# Patient Record
Sex: FEMALE | Race: BLACK OR AFRICAN AMERICAN | NOT HISPANIC OR LATINO | Employment: FULL TIME | URBAN - METROPOLITAN AREA
[De-identification: names, ages, dates, MRNs, and addresses within clinical notes are randomized per-mention and may not be internally consistent; named-entity substitution may affect disease eponyms.]

---

## 2022-11-09 ENCOUNTER — NURSE TRIAGE (OUTPATIENT)
Dept: ADMINISTRATIVE | Facility: CLINIC | Age: 57
End: 2022-11-09

## 2022-11-09 NOTE — TELEPHONE ENCOUNTER
Pt escalated through [facility] program.  Pt states she started having a really headache this morning.  Pt states she took Tylenol but it is not helping the headache. Pt describes pain as dull.    Pt was advised to go to [facility] per triage protocol.  Pt verbalized understanding.    Unable to route to MD.    Reason for Disposition   SEVERE headache, sudden-onset (i.e., reaching maximum intensity within seconds to 1 hour)    Additional Information   Negative: Difficult to awaken or acting confused (e.g., disoriented, slurred speech)   Negative: Weakness of the face, arm or leg on one side of the body and new-onset   Negative: Numbness of the face, arm or leg on one side of the body and new-onset   Negative: Loss of speech or garbled speech and new-onset   Negative: Passed out (i.e., fainted, collapsed and was not responding)   Negative: Sounds like a life-threatening emergency to the triager   Negative: Unable to walk without falling   Negative: Stiff neck (can't touch chin to chest)   Negative: Possibility of carbon monoxide exposure   Negative: SEVERE headache, states 'worst headache' of life    Protocols used: Headache-A-[place]

## 2022-11-11 ENCOUNTER — NURSE TRIAGE (OUTPATIENT)
Dept: ADMINISTRATIVE | Facility: CLINIC | Age: 57
End: 2022-11-11

## 2022-11-11 NOTE — TELEPHONE ENCOUNTER
OOC incoming call - Pt c/o cold, congestion, taking mucinex d, fever, thick yellow sputum, mild sob with exertion, sat 97% currently but running fever. Covid protocol followed and pt advised to speak to her doctor today or use a virtual doctor or go to the [facility]. Pt doesn't have an [facility] pcp to route encounter to at this time for further advice and has been directed to seek a doctors [place] today by other means. Alert and oriented, Pt agrees with dispo to see a doctor today for pneumonia or go to an [facility]/er. Invited Pt to call ooc at 1 [phone] to speak with a triage nurse at anytime.  OCA offered and accepted.   Reason for Disposition   MILD difficulty breathing (e.g., minimal/no SOB at rest, SOB with walking, pulse <100)    Additional Information   Negative: SEVERE difficulty breathing (e.g., struggling for each breath, speaks in single words)   Negative: Difficult to awaken or acting confused (e.g., disoriented, slurred speech)   Negative: Bluish (or gray) lips or face now   Negative: Shock suspected (e.g., cold/pale/clammy skin, too weak to stand, low BP, rapid pulse)   Negative: Sounds like a life-threatening emergency to the triager   Negative: SEVERE or constant chest pain or pressure  (Exception: Mild central chest pain, present only when coughing.)   Negative: MODERATE difficulty breathing (e.g., speaks in phrases, SOB even at rest, pulse 100-120)   Negative: Headache and stiff neck (can't touch chin to chest)   Negative: Chest pain or pressure  (Exception: MILD central chest pain, present only when coughing.)   Negative: Patient sounds very sick or weak to the triager    Protocols used: Coronavirus (COVID-19) Diagnosed or [id number]-[id number]

## 2023-07-13 PROBLEM — R25.1 TREMOR: Status: ACTIVE | Noted: 2023-07-13

## 2023-08-03 PROBLEM — M75.02 ADHESIVE CAPSULITIS OF LEFT SHOULDER: Status: ACTIVE | Noted: 2023-08-03
